# Patient Record
Sex: MALE | Race: WHITE
[De-identification: names, ages, dates, MRNs, and addresses within clinical notes are randomized per-mention and may not be internally consistent; named-entity substitution may affect disease eponyms.]

---

## 2017-06-07 ENCOUNTER — HOSPITAL ENCOUNTER (OUTPATIENT)
Dept: HOSPITAL 39 - GMAB | Age: 82
Discharge: HOME | End: 2017-06-07
Attending: FAMILY MEDICINE
Payer: MEDICARE

## 2017-06-07 DIAGNOSIS — I10: ICD-10-CM

## 2017-06-07 DIAGNOSIS — Z12.5: Primary | ICD-10-CM

## 2017-06-07 PROCEDURE — 84443 ASSAY THYROID STIM HORMONE: CPT

## 2018-06-11 ENCOUNTER — HOSPITAL ENCOUNTER (OUTPATIENT)
Dept: HOSPITAL 39 - GMAB | Age: 83
End: 2018-06-11
Attending: FAMILY MEDICINE
Payer: MEDICARE

## 2018-06-11 DIAGNOSIS — Z12.5: ICD-10-CM

## 2018-06-11 DIAGNOSIS — I10: Primary | ICD-10-CM

## 2018-06-11 PROCEDURE — 84443 ASSAY THYROID STIM HORMONE: CPT

## 2019-06-19 ENCOUNTER — HOSPITAL ENCOUNTER (OUTPATIENT)
Dept: HOSPITAL 39 - GMAE | Age: 84
End: 2019-06-19
Attending: FAMILY MEDICINE
Payer: MEDICARE

## 2019-06-19 DIAGNOSIS — I10: ICD-10-CM

## 2019-06-19 DIAGNOSIS — R53.81: Primary | ICD-10-CM

## 2019-06-19 DIAGNOSIS — Z12.5: ICD-10-CM

## 2019-06-19 PROCEDURE — 84443 ASSAY THYROID STIM HORMONE: CPT

## 2019-07-08 ENCOUNTER — HOSPITAL ENCOUNTER (OUTPATIENT)
Dept: HOSPITAL 39 - GMAE | Age: 84
End: 2019-07-08
Attending: FAMILY MEDICINE
Payer: MEDICARE

## 2019-07-08 DIAGNOSIS — K76.9: ICD-10-CM

## 2019-07-08 DIAGNOSIS — R07.2: Primary | ICD-10-CM

## 2019-07-08 NOTE — CT
EXAM:  CT Angiography Chest With Intravenous Contrast



CLINICAL HISTORY:  83 years old and is Male; CHEST PAIN



TECHNIQUE:  Axial computed tomographic angiography images of the

chest with intravenous contrast using pulmonary embolism

protocol.  Sagittal and coronal reformatted images were created

and reviewed.  Sagittal and coronal reformatted images were

created and reviewed.  This CT exam was performed using one or

more of the following dose reduction techniques:  automated

exposure control, adjustment of the mA and/or kV according to

patient size, and/or use of iterative reconstruction technique. 

MIP reconstructed images were created and reviewed.



COMPARISON:  CT abdomen 3/17/2016



FINDINGS:

  Limitations:  None.

  Pulmonary arteries:  Unremarkable.  No pulmonary embolism.

  Aorta:  Ectatic aorta. Ascending aorta measures 3.8 cm maximal

dimension. No dissection.

  Lungs:  Unremarkable.  No mass.  No consolidation.

  Pleural space:  Unremarkable.  No significant effusion.  No

pneumothorax.

  Heart:  Cardiomegaly.  No significant pericardial effusion.  No

evidence of RV dysfunction. No CT follow-up is considered

necessary.

  Bones/joints:  No acute fracture.  No dislocation.

  Soft tissues:  Unremarkable.

  Lymph nodes:  There are a few prominent unenlarged mediastinal

lymph nodes likely reactive.

  Liver:  There are multiple solid lesions in the liver measuring

up to 3.0 x 3.1 cm.

      There is a 1.7 cm left hepatic lobe cyst.

  Pancreas:  Heterogeneous appearance of the pancreas with

possible ductal dilatation.

  Kidneys and ureters:  There is a 1.9 cm benign cyst in the

right kidney.



IMPRESSION:     

1.  No pulmonary malaise noted.

2.  Numerous solid lesions in the liver. Metastatic disease not

excluded.

3.  Question pancreatic ductal dilatation. It may be of benefit

to obtain a dedicated pancreas protocol CT.



Electronically signed by:  Farheen Garcia MD  7/8/2019 6:08 PM

CDT Workstation: [id number]

## 2019-07-17 ENCOUNTER — HOSPITAL ENCOUNTER (OUTPATIENT)
Dept: HOSPITAL 39 - RAD | Age: 84
End: 2019-07-17
Attending: FAMILY MEDICINE
Payer: MEDICARE

## 2019-07-17 DIAGNOSIS — M79.605: ICD-10-CM

## 2019-07-17 DIAGNOSIS — I82.441: Primary | ICD-10-CM

## 2019-07-17 NOTE — US
EXAM DESCRIPTION: Venous,Lower Extremity RT



CLINICAL HISTORY: Leg pain



COMPARISON: None Available.



TECHNIQUE: Right lower extremity venous duplex



FINDINGS:



Doppler evaluation of the right  lower extremity deep veins was

performed. Normal color flow is seen in the common femoral,

superficial femoral, profunda femoral and greater saphenous

veins. Normal flow is seen in the popliteal vein. In the right

calf, noncompressible right posterior tibial vein is consistent

with deep venous thrombosis.



I discussed the results with the nurse at Dr. Valero's office

at 12:44 PM on 7/17/2019.



IMPRESSION:



Positive DVT in the right calf involving posterior tibial vein.



Electronically signed by:  Tristan Willard MD  7/17/2019 12:44

PM CDT Workstation: [id number]

## 2019-07-17 NOTE — US
EXAM DESCRIPTION: Venous,Lower Extremity LT



CLINICAL HISTORY: Leg pain



COMPARISON: None Available.



TECHNIQUE: Left lower extremity venous duplex



FINDINGS:



Doppler evaluation of the left  lower extremity deep veins was

performed. Normal color flow is seen in the common femoral,

superficial femoral, profunda femoral and greater saphenous

veins. Normal flow is seen in the popliteal vein and veins below

the knee in the calf.



Normal venous compressibility and flow augmentation.



IMPRESSION:



Negative for evidence of deep venous thrombosis on left lower

extremity venous Doppler sonogram.



Electronically signed by:  Tristan Willard MD  7/17/2019 10:50

AM CDT Workstation: [id number]

## 2019-08-04 ENCOUNTER — HOSPITAL ENCOUNTER (OUTPATIENT)
Dept: HOSPITAL 39 - ER | Age: 84
Setting detail: OBSERVATION
LOS: 1 days | Discharge: HOME | End: 2019-08-05
Attending: NURSE PRACTITIONER | Admitting: NURSE PRACTITIONER
Payer: MEDICARE

## 2019-08-04 DIAGNOSIS — Z86.19: ICD-10-CM

## 2019-08-04 DIAGNOSIS — I36.1: ICD-10-CM

## 2019-08-04 DIAGNOSIS — I10: ICD-10-CM

## 2019-08-04 DIAGNOSIS — Z79.01: ICD-10-CM

## 2019-08-04 DIAGNOSIS — Z82.49: ICD-10-CM

## 2019-08-04 DIAGNOSIS — I65.23: ICD-10-CM

## 2019-08-04 DIAGNOSIS — I82.441: ICD-10-CM

## 2019-08-04 DIAGNOSIS — I35.1: ICD-10-CM

## 2019-08-04 DIAGNOSIS — C25.9: ICD-10-CM

## 2019-08-04 DIAGNOSIS — I45.10: ICD-10-CM

## 2019-08-04 DIAGNOSIS — C78.7: ICD-10-CM

## 2019-08-04 DIAGNOSIS — G47.33: ICD-10-CM

## 2019-08-04 DIAGNOSIS — Z79.899: ICD-10-CM

## 2019-08-04 DIAGNOSIS — Z80.0: ICD-10-CM

## 2019-08-04 DIAGNOSIS — I63.532: Primary | ICD-10-CM

## 2019-08-04 DIAGNOSIS — Z99.89: ICD-10-CM

## 2019-08-04 PROCEDURE — 82550 ASSAY OF CK (CPK): CPT

## 2019-08-04 PROCEDURE — 80053 COMPREHEN METABOLIC PANEL: CPT

## 2019-08-04 PROCEDURE — 85610 PROTHROMBIN TIME: CPT

## 2019-08-04 PROCEDURE — 93306 TTE W/DOPPLER COMPLETE: CPT

## 2019-08-04 PROCEDURE — 70553 MRI BRAIN STEM W/O & W/DYE: CPT

## 2019-08-04 PROCEDURE — 83605 ASSAY OF LACTIC ACID: CPT

## 2019-08-04 PROCEDURE — 80048 BASIC METABOLIC PNL TOTAL CA: CPT

## 2019-08-04 PROCEDURE — 84443 ASSAY THYROID STIM HORMONE: CPT

## 2019-08-04 PROCEDURE — 93971 EXTREMITY STUDY: CPT

## 2019-08-04 PROCEDURE — 99285 EMERGENCY DEPT VISIT HI MDM: CPT

## 2019-08-04 PROCEDURE — 85025 COMPLETE CBC W/AUTO DIFF WBC: CPT

## 2019-08-04 PROCEDURE — 94760 N-INVAS EAR/PLS OXIMETRY 1: CPT

## 2019-08-04 PROCEDURE — 93005 ELECTROCARDIOGRAM TRACING: CPT

## 2019-08-04 PROCEDURE — 85730 THROMBOPLASTIN TIME PARTIAL: CPT

## 2019-08-04 PROCEDURE — 83735 ASSAY OF MAGNESIUM: CPT

## 2019-08-04 PROCEDURE — 84484 ASSAY OF TROPONIN QUANT: CPT

## 2019-08-04 PROCEDURE — 70450 CT HEAD/BRAIN W/O DYE: CPT

## 2019-08-04 PROCEDURE — 93880 EXTRACRANIAL BILAT STUDY: CPT

## 2019-08-04 PROCEDURE — 80061 LIPID PANEL: CPT

## 2019-08-04 PROCEDURE — 82553 CREATINE MB FRACTION: CPT

## 2019-08-04 PROCEDURE — 81001 URINALYSIS AUTO W/SCOPE: CPT

## 2019-08-04 PROCEDURE — 80076 HEPATIC FUNCTION PANEL: CPT

## 2019-08-04 PROCEDURE — 36415 COLL VENOUS BLD VENIPUNCTURE: CPT

## 2019-08-04 NOTE — ED.PDOC
History of Present Illness





- General


Chief Complaint: Neuro Symptoms/Deficits


Stated Complaint: lightheaded/dysequilibrium


Time Seen by Provider: 08/04/19 21:41


Source: patient, family





- History of Present Illness


Initial Comments: 





Chucky Nicholson 84 y/o male brought by family to ER with lightheadedness and tends

to walk more on his  right side and does not feel it until he hits the wall;wife

also noted that he had difficulty comprehending what he was told..No weakness,no

blurry vision.no slurred speech,no headaches.Recently diagnosed with pancreatic 

cancer /liver metastases and DVT right leg.Presently on eliquis 5 mg 

BID.Recently seen also his cardiologist thallium stress test was negative.called

up his cardiologist and was advised to be seen at ER since he is on 

anticoagulant.Has also Hx of paulette and not using CPAP recently needs to be 

replaced but appointment still in September.


Timing/Duration: waxing and waning, other - 3 days


Severity: moderate


Episode Description: see hpi


Improving Factors: nothing


Worsening Factors: nothing


Associated Symptoms: other - see hpi


Allergies/Adverse Reactions: 


Allergies





NO KNOWN ALLERGY Allergy (Verified 08/04/19 21:53)


   





Home Medications: 


Ambulatory Orders





Acetaminophen W/ Codeine [Tylenol W/ CODEINE #3] 2 ea PO Q6HRS PRN 08/04/19 


Amlodipine Besylate [Norvasc] 10 mg PO DAILY 08/04/19 


Apixaban [Eliquis] 5 mg PO BID 08/04/19 


Dutasteride 0.5 mg PO DAILY 08/04/19 


Telmisartan 40 mg PO DAILY 08/04/19 











Review of Systems





- Review of Systems


Constitutional: States: no symptoms reported


EENTM: States: no symptoms reported


Respiratory: States: no symptoms reported


Cardiology: States: no symptoms reported


Gastrointestinal/Abdominal: States: no symptoms reported


Genitourinary: States: no symptoms reported


Musculoskeletal: States: no symptoms reported


Skin: States: no symptoms reported


Neurological: States: see HPI


All other Systems: Reviewed and Negative, No Change from Baseline





Past Medical History (General)





- Patient Medical History


Hx Cancer: Yes - pancreas w/metastases to liver


Hx Other PMH: Yes - DVT,PAULETTE


Surgical History: no surgical history





- Social History


Hx Tobacco Use: No


Hx Physical Abuse: No


Hx Emotional Abuse: No





- Activities of Daily Living


Patient Lives Alone: No





Family Medical History





- Family History


  ** Mother


Family History: Unknown


Hx Cardiac Disease: Yes - dad-MI


Hx Family Cancer: Yes - colon-brother





Physical Exam





- Physical Exam


General Appearance: Alert, Comfortable, No apparent distress


Eye Exam: bilateral normal


ENT Exam: normal ENT inspection, hearing grossly normal


Neck: supple, normal inspection, trachea midline


Respiratory: chest non-tender, lungs clear, normal breath sounds


Cardiovascular/Chest: normal peripheral pulses, regular rate, rhythm, no murmur


Peripheral Pulses: radial,right: 2+, radial,left: 2+


Gastrointestinal/Abdominal: non tender, soft, no organomegaly


Back Exam: no CVA tenderness, no vertebral tenderness


Extremities Exam: non-tender, no evidence of injury


Mental Status: alert, oriented x 3, other - speaks in full sentences


CNs Exam: normal hearing, normal speech, PERRL


Coordination/Gait: normal gait


Motor/Sensory: no motor deficit, no sensory deficit, no pronator drift


Skin Exam: normal color, warm/dry





Progress





- Progress


Progress: 





08/04/19 22:47


                               Vital Signs - 8 hr











  08/04/19





  21:39


 


Temperature 98.8 F


 


Pulse Rate [ 83





monitor] 


 


Respiratory 18





Rate 


 


Blood Pressure 147/79





[Left Arm] 


 


O2 Sat by Pulse 94 L





Oximetry 














- Results/Orders


Results/Orders: 





                                        





08/04/19 21:47


IV Care:Saline Lock per Protoc QSHIFT 








                         Laboratory Results - last 24 hr











  08/04/19 08/04/19 08/04/19





  22:30 22:30 23:02


 


WBC  10.2  


 


RBC  4.33 L  


 


Hgb  14.2  


 


Hct  41.3 L  


 


MCV  95.5 H  


 


MCH  32.9 H  


 


MCHC  34.4  


 


RDW  13.3  


 


Plt Count  173  


 


MPV  8.1  


 


Absolute Neuts (auto)  8.00 H  


 


Absolute Lymphs (auto)  1.00  


 


Absolute Monos (auto)  1.00 H  


 


Absolute Eos (auto)  0.20  


 


Absolute Basos (auto)  0.00  


 


Neutrophils %  78.0  


 


Lymphocytes %  9.6 L  


 


Monocytes %  10.1 H  


 


Eosinophils %  1.8  


 


Basophils %  0.5  


 


PT  11.0 H  


 


INR  1.10  


 


PTT (SP)  26.1  


 


Sodium  132 L  


 


Potassium  3.8  


 


Chloride  99 L  


 


Carbon Dioxide  22  


 


Anion Gap  14.8  


 


BUN  15  


 


Creatinine  0.68  


 


BUN/Creatinine Ratio  22.1 H  


 


Random Glucose  180 H  


 


Serum Osmolality  269.9 L  


 


Lactic Acid   2.0 


 


Calcium  9.0  


 


Magnesium  2.1  


 


Total Bilirubin  0.8  


 


Direct Bilirubin  0.1  


 


Indirect Bilirubin  0.7  


 


AST  36  


 


ALT  36  


 


Alkaline Phosphatase  101  


 


Creatine Kinase  96  


 


CK-MB (CK-2)  3.1  


 


CK-MB (CK-2) %  Not Reportable  


 


Troponin I  0.05  


 


Serum Total Protein  6.5  


 


Albumin  4.0  


 


Urine Color    Yellow


 


Urine Appearance    Clear


 


Urine pH    5.5


 


Ur Specific Gravity    1.010


 


Urine Protein    Negative


 


Urine Glucose (UA)    Negative


 


Urine Ketones    Negative


 


Urine Blood    Trace-intact H


 


Urine Nitrite    Negative


 


Urine Bilirubin    Negative


 


Urine Urobilinogen    0.2


 


Ur Leukocyte Esterase    Negative


 


Urine RBC    0-1


 


Urine WBC    0-1


 


Ur Epithelial Cells    1-3


 


Urine Bacteria    Rare








Discuss test results with patient recommended hospital OBS and agree with plan.





- EKG/XRAY/CT


CT Ordered: Yes - head-no acute infarct or hemorrhage





Departure





- Departure


Clinical Impression: 


 Light headedness, Pancreatic cancer metastasized to liver, History of DVT of 

lower extremity, On continuous oral anticoagulation





Time of Disposition: 23:37


Disposition: Admit Patient


Condition: Fair


Departure Forms:  Patient Portal Self Enrollment


Referrals: 


TOVA SANTACRUZ MD [Primary Care Provider] - 1-2 Weeks


Home Medications: 


Ambulatory Orders





Acetaminophen W/ Codeine [Tylenol W/ CODEINE #3] 2 ea PO Q6HRS PRN 08/04/19 


Amlodipine Besylate [Norvasc] 10 mg PO DAILY 08/04/19 


Apixaban [Eliquis] 5 mg PO BID 08/04/19 


Dutasteride 0.5 mg PO DAILY 08/04/19 


Telmisartan 40 mg PO DAILY 08/04/19 











Decision To Admit





- Decistion To Admit


Decision to Admit Reason: Admit from ER


Decision to Admit Date: 08/04/19 - D/W Jose Cast-ANP/Hospitalist


Decision to Admit Time: 23:35

## 2019-08-04 NOTE — CT
EXAM DESCRIPTION:



 Head



CLINICAL HISTORY: 



83 years  Male  unsteady gait, dizziness since Friday



COMPARISON: 



None



TECHNIQUE: 



Images were obtained in axial, sagittal, and coronal planes.



This exam was performed according to our departmental

dose-optimization program which includes use of Automated

Exposure Control, adjustment of the mA and/or kV according to

patient size and/or use of iterative reconstruction technique. 



FINDINGS: 



Ventricular system is enlarged. Moderate prominence of the

cortical sulci. Moderate cerebral volume loss.



No abnormal areas of increased attenuation seen. No extra-axial

fluid collections noted.



No evidence for skull fracture. Unremarkable paranasal sinuses.

Symmetric aeration mastoid air cells bilaterally.



IMPRESSION: 



No acute intracranial abnormality. No evidence for hemorrhage,

mass lesion, or large acute infarction.



Electronically signed by:  Megan Hassan MD  8/4/2019 10:21 PM CDT

Workstation: [id number]

## 2019-08-05 VITALS — DIASTOLIC BLOOD PRESSURE: 83 MMHG | SYSTOLIC BLOOD PRESSURE: 170 MMHG

## 2019-08-05 VITALS — OXYGEN SATURATION: 95 %

## 2019-08-05 VITALS — TEMPERATURE: 97.8 F

## 2019-08-05 NOTE — US
EXAM DESCRIPTION: 

Venous,Lower Extremity RT: ULTRASOUND.



CLINICAL HISTORY: 

Hx of DVT right posterior tibial vein



COMPARISON: .

None Available.



TECHNIQUE: 

Gray-scale and doppler sonographic evaluation of the deep venous

system of the right lower extremity.



FINDINGS: 

Doppler evaluation shows normal color flow and normal phasicity

and augmentation of the right common femoral vein, femoral vein,

popliteal vein, greater/lesser saphenous vein, . Echogenic

thrombus is still noted in the right posterior tibial vein. The

right lower extremity deep veins were completely compressible;

normal occlusion with transducer pressure. Gray-scale survey

showed no echogenic thrombus within these veins. Incomplete

compression of the right posterior tibial vein. Decreased venous

waveform and color.  



IMPRESSION: 

1.  Duplex ultrasound evaluation of the right lower extremity

deep venous system showing partial thrombus remaining in the

right posterior tibial vein.

2.  No evidence of thrombosis in the remaining veins of the deep

venous system of the right lower extremity.



Electronically signed by:  Jamar Marcos MD  8/5/2019 3:13 PM CDT

Workstation: [id number]

## 2019-08-05 NOTE — MRI
EXAM DESCRIPTION: 

Brain w/wo Contrast: Magnetic Resonance Imaging.



CLINICAL HISTORY: 

83 years Male Ataxia, dizziness



COMPARISON: 

Head CT scan 8/4/2019.



TECHNIQUE: 

Multiplanar, high-field MRI, multiple conventional sequences,

without and with gadolinium IV  contrast. No adverse reactions.

Multiple axial diffusion sequences. 



FINDINGS: 

Multiple small foci of bright signal on the diffusion-weighted

[id number] sequences in the cortical gray matter left occipital lobe

parasagittal at the level of the ventricles 1-2 lesions similar

location right occipital lobe also right frontoparietal cortex

near the vertex and 8 single lesion left cerebellar hemisphere.

These lesions are not associated with hemorrhage or significant

mass effect. No abnormal contrast enhancement. 



Otherwise, multiple foci of chronic hyperintense FLAIR and

T2-weighted signal in the periventricular white matter and

subcortical white matter junctions of the cerebral hemispheres.

Involvement of the frontal parietal and occipital lobes and the

centrum semiovale. Less involvement of the temporal lobes . 

Similar signal posterior bilateral basal ganglia. No hemorrhage,

no cerebral edema, no mass-effect. Normal contrast enhancement.

Normal signal in the brainstem or cerebellar hemisphere. No

hemorrhage, no cerebral edema, no mass-effect. Normal contrast

enhancement.



Concordance of the diffusion and non-diffusion sequences with no

evidence of acute or subacute infarction. Cortical sulci,

ventricles, and other CSF spaces, and the subdural spaces are age

appropriate in configuration. No effacement or displacement. No

midline shift. No extra-axial hemorrhage. Normal contrast

enhancement.



Normal flow signal void in the major vessels of the [place]

[place]aid[place], and the venous sinuses. Some sequences show abnormal flow

in the left cerebellar artery; contrast sequences show contrast

in intermittent segments of the left cerebellar artery as it

crosses across the midline to form the left basilar artery. IACs

are symmetric bilaterally. No fluid in the bilateral mastoid air

cells. No mass effect in the bilateral Cerebellopontine angles.

Normal contrast enhancement.



Pituitary gland occupies most of the sella. Normal contrast

enhancement. Base of the cerebellar tonsils is above the foramen

magnum. Minimal mucoperiosteal thickening in the bilateral

ethmoid and right maxillary paranasal sinuses. The bony calvarium

is intact.



IMPRESSION: 

1. 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

posterior lesions in the right occipital lobe, right

frontoparietal cortex at the vertex, and left subcortical

cerebellar hemisphere solo lesion. Not associated with

hemorrhage, mass effect, or abnormal enhancement.

2. Left vertebral artery flow is slow but normal contrast flow

with no no intraluminal lesions. Status of smaller terminal

arterial flow in the infarcted regions cannot be determined.

3. Chronic bilateral periventricular white matter and subcortical

white matter ischemic changes which are most likely age-related

and/or cerebral microvascular disease.

4. Minimal sinusitis paranasal region.



CRITICAL COMMUNICATION:  

The critical value was communicated by text message by Dr. Marcos at 1112 hours with text acknowledgment by  JESSICA Reynoso, Mr. Cast's associate, at approximately 1117 hours,

on 8/5/2019.





Electronically signed by:  Jamar Marcos MD  8/5/2019 11:38 AM CDT

Workstation: [id number]

## 2019-08-05 NOTE — US
EXAM DESCRIPTION: 

Carotid Duplex: ULTRASOUND.



CLINICAL HISTORY:

83 years Male Ataxia, dizziness



COMPARISON: 

Right lower extremity deep venous duplex ultrasound. On the same

visit



TECHNIQUE: 

Transcutaneous scanning utilizing gray-scale and Doppler modes to

evaluate the bilateral carotid systems and vertebral arteries. 

Percentage of diameter of stenosis or no stenosis recorded will

be based upon NASCET criteria.



FINDINGS: 

Peak systolic/end diastolic (CM-Sec) 

CCA Right 63/8 Left 74/8. 

ICA Right proximal 44/7, distal 44/4. Left proximal 44/9, mid

36/8.

Vertebral Right not visualized Left 39/13.

ECA (PS Only) Right 39 left 70.



ICA/CCA peak systolic ratio: Right  0.7  Left 0.6

ICA/CCA end diastolic ratio:  Right   0.6  Left  1.1

Vertebral arteries:  Left antegrade flow. Right vertebral artery

was not visualized.

Comments: Bilateral atherosclerotic calcifications more prominent

on the right. Area stenosis of the right common carotid bulb is

22%. Diameter stenosis 32%. Spectral broadening in the proximal

and mid left ICA.



IMPRESSION: 

1. Doppler evaluation of the bilateral carotid systems and

vertebral arteries shows no hemodynamically significant stenoses.

2. Moderate amount plaque seen in the carotid arteries

bilaterally. Left vertebral artery showed antegrade-cephalad

flow; right vertebral artery was not visualized.



Electronically signed by:  Jamar Marcos MD  8/5/2019 3:53 PM CDT

Workstation: [id number]

## 2019-08-15 ENCOUNTER — HOSPITAL ENCOUNTER (EMERGENCY)
Dept: HOSPITAL 39 - ER | Age: 84
Discharge: ANOTHER HOSPITAL | End: 2019-08-15
Payer: MEDICARE

## 2019-08-15 VITALS — DIASTOLIC BLOOD PRESSURE: 86 MMHG | OXYGEN SATURATION: 93 % | SYSTOLIC BLOOD PRESSURE: 157 MMHG

## 2019-08-15 VITALS — TEMPERATURE: 98.1 F

## 2019-08-15 DIAGNOSIS — I10: ICD-10-CM

## 2019-08-15 DIAGNOSIS — C78.7: ICD-10-CM

## 2019-08-15 DIAGNOSIS — Z79.899: ICD-10-CM

## 2019-08-15 DIAGNOSIS — Z86.718: ICD-10-CM

## 2019-08-15 DIAGNOSIS — C25.9: ICD-10-CM

## 2019-08-15 DIAGNOSIS — Z79.01: ICD-10-CM

## 2019-08-15 DIAGNOSIS — I21.4: Primary | ICD-10-CM

## 2019-08-15 DIAGNOSIS — I45.10: ICD-10-CM

## 2019-08-15 DIAGNOSIS — R11.2: ICD-10-CM

## 2019-08-15 PROCEDURE — 85025 COMPLETE CBC W/AUTO DIFF WBC: CPT

## 2019-08-15 PROCEDURE — 80076 HEPATIC FUNCTION PANEL: CPT

## 2019-08-15 PROCEDURE — 81001 URINALYSIS AUTO W/SCOPE: CPT

## 2019-08-15 PROCEDURE — 82553 CREATINE MB FRACTION: CPT

## 2019-08-15 PROCEDURE — 83880 ASSAY OF NATRIURETIC PEPTIDE: CPT

## 2019-08-15 PROCEDURE — 84484 ASSAY OF TROPONIN QUANT: CPT

## 2019-08-15 PROCEDURE — 93005 ELECTROCARDIOGRAM TRACING: CPT

## 2019-08-15 PROCEDURE — 85730 THROMBOPLASTIN TIME PARTIAL: CPT

## 2019-08-15 PROCEDURE — 83605 ASSAY OF LACTIC ACID: CPT

## 2019-08-15 PROCEDURE — 71045 X-RAY EXAM CHEST 1 VIEW: CPT

## 2019-08-15 PROCEDURE — 83690 ASSAY OF LIPASE: CPT

## 2019-08-15 PROCEDURE — 85610 PROTHROMBIN TIME: CPT

## 2019-08-15 PROCEDURE — 36415 COLL VENOUS BLD VENIPUNCTURE: CPT

## 2019-08-15 PROCEDURE — 80048 BASIC METABOLIC PNL TOTAL CA: CPT

## 2019-08-15 PROCEDURE — 82550 ASSAY OF CK (CPK): CPT

## 2019-08-15 NOTE — ED.PDOC
History of Present Illness





- General


Chief Complaint: GI Problem


Stated Complaint: nausea/vomiting


Time Seen by Provider: 08/15/19 11:29


Source: patient


Exam Limitations: no limitations





- History of Present Illness


Initial Comments: 





Chucky Nicholson 82 y/o male brought by EMS to [facility] ER with N/V and weakness this 

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 

scheduled to go to [facility] next week for further evaluation/treatment.Also has

history of DVT presently on ELIQUIS 5 mg BID.Had nuclear scan by his 

cardiologist in July but no scheduled follow up made.


Timing/Duration: 1-3 hours


Severity: moderate


Improving Factors: nothing


Worsening Factors: nothing


Associated Symptoms: malaise


Allergies/Adverse Reactions: 


Allergies





NO KNOWN ALLERGY Allergy (Verified 08/15/19 11:48)


   





Home Medications: 


Ambulatory Orders





Acetaminophen W/ Codeine [Tylenol W/ CODEINE #3] 2 ea PO Q6HRS PRN 08/04/19 


Amlodipine Besylate [Norvasc] 10 mg PO DAILY 08/04/19 


Apixaban [Eliquis] 5 mg PO BID 08/04/19 


Dutasteride 0.5 mg PO DAILY 08/04/19 


Telmisartan 80 mg PO DAILY 08/04/19 











Review of Systems





- Review of Systems


Constitutional: States: see HPI, weakness


Gastrointestinal/Abdominal: States: see HPI, vomiting, other - pancreatic CA


All other Systems: Reviewed and Negative, No Change from Baseline





Past Medical History (General)





- Patient Medical History


Hx Seizures: No


Hx Stroke: No


Hx Dementia: No


Hx Asthma: No


Hx of COPD: No


Hx Cardiac Disorders: No


Hx Congestive Heart Failure: No


Hx Pacemaker: No


Hx Hypertension: Yes


Hx Thyroid Disease: No


Hx Diabetes: No


Hx Gastroesophageal Reflux: No


Hx Renal Disease: No


Hx Cancer: Yes - pancreas w/metastases to liver


Hx of HIV: No


Hx Hepatitis C: No


Hx MRSA: No


Surgical History: no surgical history





- Vaccination History


Hx Tetanus, Diphtheria Vaccination: No


Hx Influenza Vaccination: Yes


Hx Pneumococcal Vaccination: Yes





- Social History


Hx Tobacco Use: No


Hx Alcohol Use: No


Hx Substance Use: No


Hx Physical Abuse: No


Hx Emotional Abuse: No





Family Medical History





- Family History


  ** Mother


Family History: Unknown


Hx Cardiac Disease: Yes - dad-MI


Hx Family Cancer: Yes - colon-brother





Physical Exam





- Physical Exam


General Appearance: Alert, Anxious, No apparent distress


Eye Exam: bilateral normal


Ears, Nose, Throat: hearing grossly normal, normal ENT inspection


Neck: supple, normal inspection


Respiratory: chest non-tender, lungs clear, normal breath sounds


Cardiovascular/Chest: normal peripheral pulses, regular rate, rhythm, no murmur


Peripheral Pulses: radial,right: 2+, radial,left: 2+


Gastrointestinal/Abdominal: non tender, soft, no organomegaly


Back Exam: no CVA tenderness, no vertebral tenderness


Extremity: no pedal edema, no calf tenderness


Neurologic: alert, oriented x 3


Skin Exam: normal color, warm/dry





Progress





- Progress


Progress: 





08/15/19 12:58


                                Laboratory Tests











  08/15/19 08/15/19 08/15/19





  12:00 Unknown Unknown


 


WBC   13.0 H 


 


RBC   4.62 L 


 


Hgb   15.0 


 


Hct   44.5 


 


MCV   96.3 H 


 


MCH   32.5 H 


 


MCHC   33.8 


 


RDW   13.2 


 


Plt Count   196 


 


MPV   8.2 


 


Absolute Neuts (auto)   10.60 H 


 


Absolute Lymphs (auto)   1.00 


 


Absolute Monos (auto)   1.20 H 


 


Absolute Eos (auto)   0.10 


 


Absolute Basos (auto)   0.10 


 


Neutrophils %   81.8 H 


 


Lymphocytes %   7.8 L 


 


Monocytes %   9.1 H 


 


Eosinophils %   0.9 L 


 


Basophils %   0.4 


 


PT   11.1 H 


 


INR   1.11 


 


PTT (SP)   25.4 


 


Sodium   131 L 


 


Potassium   4.2 


 


Chloride   96 L 


 


Carbon Dioxide   23 


 


Anion Gap   16.2 


 


BUN   14 


 


Creatinine   0.78 


 


BUN/Creatinine Ratio   17.9 


 


Random Glucose   208 H 


 


Serum Osmolality   269.2 L 


 


Lactic Acid  1.9  


 


Calcium   9.3 


 


Magnesium   2.1 


 


Total Bilirubin   0.9 


 


Direct Bilirubin   0.2 


 


Indirect Bilirubin   0.7 


 


AST   69 H 


 


ALT   58 


 


Alkaline Phosphatase   118 


 


Creatine Kinase   820 H* 


 


CK-MB (CK-2)   5.4 H* 


 


CK-MB (CK-2) %   0.66 


 


Troponin I   0.12 H* 


 


B-Natriuretic Peptide    28.2


 


Serum Total Protein   7.5 


 


Albumin   4.3 


 


Lipase   23 














- Results/Orders


Results/Orders: 





                                        





08/15/19 11:31


IV Care:Saline Lock per Protoc QSHIFT 


URINALYSIS Stat 








                         Laboratory Results - last 24 hr











  08/15/19 08/15/19 08/15/19





  12:00 Unknown Unknown


 


WBC   13.0 H 


 


RBC   4.62 L 


 


Hgb   15.0 


 


Hct   44.5 


 


MCV   96.3 H 


 


MCH   32.5 H 


 


MCHC   33.8 


 


RDW   13.2 


 


Plt Count   196 


 


MPV   8.2 


 


Absolute Neuts (auto)   10.60 H 


 


Absolute Lymphs (auto)   1.00 


 


Absolute Monos (auto)   1.20 H 


 


Absolute Eos (auto)   0.10 


 


Absolute Basos (auto)   0.10 


 


Neutrophils %   81.8 H 


 


Lymphocytes %   7.8 L 


 


Monocytes %   9.1 H 


 


Eosinophils %   0.9 L 


 


Basophils %   0.4 


 


PT   11.1 H 


 


INR   1.11 


 


PTT (SP)   25.4 


 


Sodium   131 L 


 


Potassium   4.2 


 


Chloride   96 L 


 


Carbon Dioxide   23 


 


Anion Gap   16.2 


 


BUN   14 


 


Creatinine   0.78 


 


BUN/Creatinine Ratio   17.9 


 


Random Glucose   208 H 


 


Serum Osmolality   269.2 L 


 


Lactic Acid  1.9  


 


Calcium   9.3 


 


Magnesium   2.1 


 


Total Bilirubin   0.9 


 


Direct Bilirubin   0.2 


 


Indirect Bilirubin   0.7 


 


AST   69 H 


 


ALT   58 


 


Alkaline Phosphatase   118 


 


Creatine Kinase   820 H* 


 


CK-MB (CK-2)   5.4 H* 


 


CK-MB (CK-2) %   0.66 


 


Troponin I   0.12 H* 


 


B-Natriuretic Peptide    28.2


 


Serum Total Protein   7.5 


 


Albumin   4.3 


 


Lipase   23 














- EKG/XRAY/CT


EKG: Sinus, RBBB, nonspecific ST T wave Chg, ST depression - inferolateral leads


Comments: [id number]





Departure





- Departure


Clinical Impression: 


 Non-ST elevation (NSTEMI) myocardial infarction, Pancreatic cancer metastasized

 to liver





Nausea & vomiting


Qualifiers:


 Vomiting type: unspecified Vomiting Intractability: non-intractable Qualified 

Code(s): R11.2 - Nausea with vomiting, unspecified





Time of Disposition: 13:39


Disposition: Transfer to Hospital


Condition: Fair


Departure Forms:  ED Discharge - Pt. Copy, Patient Portal Self Enrollment


Referrals: 


TOVA SANTACRUZ MD [Primary Care Provider] - 1-2 Weeks


Home Medications: 


Ambulatory Orders





Acetaminophen W/ Codeine [Tylenol W/ CODEINE #3] 2 ea PO Q6HRS PRN 08/04/19 


Amlodipine Besylate [Norvasc] 10 mg PO DAILY 08/04/19 


Apixaban [Eliquis] 5 mg PO BID 08/04/19 


Dutasteride 0.5 mg PO DAILY 08/04/19 


Telmisartan 80 mg PO DAILY 08/04/19 











Transfer to Outside Facility





- Transfer Information


Accepting Provider:: -Cardiologist


Accepting Facility: [facility]


Reason for Transfer: required specialist not available - CARDIOLOGIST
